# Patient Record
Sex: MALE | Race: WHITE | NOT HISPANIC OR LATINO | Employment: OTHER | URBAN - METROPOLITAN AREA
[De-identification: names, ages, dates, MRNs, and addresses within clinical notes are randomized per-mention and may not be internally consistent; named-entity substitution may affect disease eponyms.]

---

## 2018-06-28 NOTE — PATIENT DISCUSSION
(H25.13) Age-related nuclear cataract, bilateral - Assesment : Examination revealed cataract. Moderate OU - Plan : Monitor for changes. Advised patient to call our office with decreased vision or increased symptoms.

## 2018-06-28 NOTE — PATIENT DISCUSSION
(H25.043) Posterior subcapsular polar age-related cataract, bilateral - Assesment : Examination revealed Posterior Subcapsular Polar Senile Cataract. Moderate OU - Plan : Monitor for changes. Advised patient to call our office with decreased vision or increased symptoms.

## 2018-06-28 NOTE — PATIENT DISCUSSION
(H25.013) Cortical age-related cataract, bilateral - Assesment : Examination revealed Cortical Senile Cataract. Moderate OS>OD. Patient is symptomatic with visual function affected. - Plan : Risks, Benefits and Alternatives were discussed with patient at length for Cataract Surgery. Visual symptoms are consistent with Cataract findings on examination and current refraction no longer provides satisfactory vision. Monovision with IOL's was explained to the patient as a reasonable option for patients who have experienced Monovision either naturally or with contact lenses. When significant corneal astigmatism is present a Toric IOL may be necessary to maximize vision quality. Intraoperative abberometry is usually necessary to reduce the chance for refractive surprise that may require possible lens exchange or refractive surgery correction. Glasses or contacts may still be necessary for distance or near vision. The advantages of intraoperative abberometry were explained to the patient to improve IOL selection, particularly in patients with unusually short or long eyes, previous refractive surgery, or desiring Monovision correction at the time of cataract surgery. Patient has worn Monovision  contacts and is a natural Monovision. Patient understands and desires surgery. All questions answered. Risks, Benefits and Alternatives discussed at length for IOL placement. Patient will need to wear glasses for best corrected distance and near vision.    EYE:OS IOL TYPE: Monovision-Near POST OPERATIVE TARGET: -1.75  RECOMMENDED PACKAGE:  Monovison/Toric Suzi Tiff Pkg/ORA PT PREFERRED PACKAGE: TBD OD to follow  Patient to see surgery counselor today

## 2018-06-28 NOTE — PATIENT DISCUSSION
(Z10.300) Dermatochalasis of right upper eyelid - Assesment : Examination revealed Dermatochalasis BUL - Plan : Monitor for changes. Advised patient to call our office with decreased vision or increased symptoms.

## 2018-07-09 NOTE — PATIENT DISCUSSION
(H25.013) Cortical age-related cataract, bilateral - Assesment : Examination revealed Cortical Senile Cataract. Moderate OS>OD. Patient is symptomatic with visual function affected. - Plan : Risks, Benefits and Alternatives were discussed with patient at length for Cataract Surgery. Visual symptoms are consistent with Cataract findings on examination and current refraction no longer provides satisfactory vision. Limited focus (monofocal) IOL was discussed advising that glasses or contact lenses would still be needed for best vision, no intraoperative abberometry or treatment of astigmatism or presbyopia is included. Patient understands and desires surgery. All questions answered. Risks, Benefits and Alternatives discussed at length for IOL placement. Patient will need to wear glasses for best corrected  vision at distance and near.    EYE:OS IOL TYPE: Monofocal POST OPERATIVE TARGET: Ashleigh CASILLAS RECOMMENDED PACKAGE:  None PT PREFERRED PACKAGE: None OD to follow  Patient to see surgery counselor today

## 2018-08-02 NOTE — PATIENT DISCUSSION
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 - Plan : Discussed signs and symptoms of infection and retinal detachments. Do not rub operated eye. Follow drop schedule If redness,pain,decreased vision, flashes or floaters occur then contact clinic.   RTC 1 week follow up

## 2018-08-02 NOTE — PATIENT DISCUSSION
(H25.011) Cortical age-related cataract, right eye - Assesment : Examination revealed Cortical Senile Cataract. Moderate OS>OD. Patient is symptomatic with visual function affected. - Plan : Risks, Benefits and Alternatives were discussed with patient at length for Cataract Surgery. Visual symptoms are consistent with Cataract findings on examination and current refraction no longer provides satisfactory vision. Limited focus (monofocal) IOL was discussed advising that glasses or contact lenses would still be needed for best vision, no intraoperative abberometry or treatment of astigmatism or presbyopia is included. Patient understands and desires surgery. All questions answered. Risks, Benefits and Alternatives discussed at length for IOL placement. Patient will need to wear glasses for best corrected  vision at distance and near.    EYE:OD IOL TYPE: Monofocal POST OPERATIVE TARGET: Ashleigh CASILLAS RECOMMENDED PACKAGE:  None PT PREFERRED PACKAGE: None  Patient to see surgery counselor today

## 2018-08-07 NOTE — PATIENT DISCUSSION
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 - Plan : Discussed signs and symptoms of infection and retinal detachments. Do not rub operated eye. Follow drop schedule If redness,pain,decreased vision, flashes or floaters occur then contact clinic.   RTC 3 weeks follow up

## 2018-08-07 NOTE — PATIENT DISCUSSION
(H25.011) Cortical age-related cataract, right eye - Assesment : Examination revealed Cortical Senile Cataract. Moderate OD. Patient is symptomatic with visual function affected. - Plan : Risks, Benefits and Alternatives were discussed with patient at length for Cataract Surgery. Visual symptoms are consistent with Cataract findings on examination and current refraction no longer provides satisfactory vision. Limited focus (monofocal) IOL was discussed advising that glasses or contact lenses would still be needed for best vision, no intraoperative abberometry or treatment of astigmatism or presbyopia is included. Patient understands and desires surgery. All questions answered. Risks, Benefits and Alternatives discussed at length for IOL placement. Patient will need to wear glasses for best corrected  vision at  near.    EYE:OD IOL TYPE: Monofocal POST OPERATIVE TARGET: Ashleigh CASILLAS RECOMMENDED PACKAGE:  None PT PREFERRED PACKAGE: None  Patient to see surgery counselor today

## 2018-08-16 NOTE — PATIENT DISCUSSION
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 - Plan : Discussed signs and symptoms of infection and retinal detachments. Do not rub operated eye. Follow drop schedule If redness,pain,decreased vision, flashes or floaters occur then contact clinic.   RTC 1 week followup

## 2018-09-14 NOTE — PATIENT DISCUSSION
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 - Plan : Signs and symptoms of infection and retinal detachment are outlined in your surgical packet. Do not rub operated eye. If redness, pain, decreased vision, flashes or floaters occur then contact clinic. 1st glasses prescription after surgery given.   RTC 1 year/EXAM

## 2020-10-01 ENCOUNTER — NEW PATIENT COMPREHENSIVE (OUTPATIENT)
Dept: URBAN - METROPOLITAN AREA CLINIC 39 | Facility: CLINIC | Age: 85
End: 2020-10-01

## 2020-10-01 DIAGNOSIS — H35.30: ICD-10-CM

## 2020-10-01 DIAGNOSIS — H43.812: ICD-10-CM

## 2020-10-01 DIAGNOSIS — H25.812: ICD-10-CM

## 2020-10-01 PROCEDURE — 92134 CPTRZ OPH DX IMG PST SGM RTA: CPT

## 2020-10-01 PROCEDURE — 92004 COMPRE OPH EXAM NEW PT 1/>: CPT

## 2020-10-01 PROCEDURE — 92015 DETERMINE REFRACTIVE STATE: CPT

## 2020-10-01 ASSESSMENT — KERATOMETRY
OS_AXISANGLE_DEGREES: 33
OS_K1POWER_DIOPTERS: 45.75
OS_AXISANGLE2_DEGREES: 123
OS_K2POWER_DIOPTERS: 46.5

## 2020-10-01 ASSESSMENT — TONOMETRY: OS_IOP_MMHG: 13

## 2020-10-01 ASSESSMENT — VISUAL ACUITY
OS_CC: J6
OS_SC: 20/80
OS_SC: J10
OS_BAT: <20/400 W/MR
OS_PH: 20/70-1

## 2024-08-29 NOTE — PATIENT DISCUSSION
(Z86.451) Dermatochalasis of left upper eyelid - Assesment : Examination revealed Dermatochalasis BUL - Plan : Monitor for changes. Advised patient to call our office with decreased vision or increased symptoms. None known